# Patient Record
Sex: MALE | Race: WHITE
[De-identification: names, ages, dates, MRNs, and addresses within clinical notes are randomized per-mention and may not be internally consistent; named-entity substitution may affect disease eponyms.]

---

## 2018-01-17 ENCOUNTER — HOSPITAL ENCOUNTER (EMERGENCY)
Dept: HOSPITAL 17 - NEPD | Age: 63
Discharge: HOME | End: 2018-01-17
Payer: COMMERCIAL

## 2018-01-17 VITALS
RESPIRATION RATE: 15 BRPM | DIASTOLIC BLOOD PRESSURE: 79 MMHG | HEART RATE: 72 BPM | SYSTOLIC BLOOD PRESSURE: 147 MMHG | OXYGEN SATURATION: 98 %

## 2018-01-17 VITALS — OXYGEN SATURATION: 97 %

## 2018-01-17 VITALS
RESPIRATION RATE: 16 BRPM | HEART RATE: 68 BPM | TEMPERATURE: 99.2 F | SYSTOLIC BLOOD PRESSURE: 149 MMHG | OXYGEN SATURATION: 97 % | DIASTOLIC BLOOD PRESSURE: 76 MMHG

## 2018-01-17 VITALS — HEIGHT: 70 IN | WEIGHT: 174.17 LBS | BODY MASS INDEX: 24.93 KG/M2

## 2018-01-17 VITALS — OXYGEN SATURATION: 95 %

## 2018-01-17 DIAGNOSIS — J20.9: Primary | ICD-10-CM

## 2018-01-17 DIAGNOSIS — R94.31: ICD-10-CM

## 2018-01-17 LAB
ALBUMIN SERPL-MCNC: 3.5 GM/DL (ref 3.4–5)
ALP SERPL-CCNC: 99 U/L (ref 45–117)
ALT SERPL-CCNC: 25 U/L (ref 12–78)
AST SERPL-CCNC: 23 U/L (ref 15–37)
BASOPHILS # BLD AUTO: 0 TH/MM3 (ref 0–0.2)
BASOPHILS NFR BLD: 0.4 % (ref 0–2)
BILIRUB SERPL-MCNC: 0.7 MG/DL (ref 0.2–1)
BUN SERPL-MCNC: 12 MG/DL (ref 7–18)
CALCIUM SERPL-MCNC: 9.1 MG/DL (ref 8.5–10.1)
CHLORIDE SERPL-SCNC: 103 MEQ/L (ref 98–107)
CREAT SERPL-MCNC: 0.99 MG/DL (ref 0.6–1.3)
EOSINOPHIL # BLD: 0.1 TH/MM3 (ref 0–0.4)
EOSINOPHIL NFR BLD: 1.2 % (ref 0–4)
ERYTHROCYTE [DISTWIDTH] IN BLOOD BY AUTOMATED COUNT: 12.9 % (ref 11.6–17.2)
GFR SERPLBLD BASED ON 1.73 SQ M-ARVRAT: 77 ML/MIN (ref 89–?)
GLUCOSE SERPL-MCNC: 108 MG/DL (ref 74–106)
HCO3 BLD-SCNC: 26.1 MEQ/L (ref 21–32)
HCT VFR BLD CALC: 43.7 % (ref 39–51)
HGB BLD-MCNC: 14.8 GM/DL (ref 13–17)
LYMPHOCYTES # BLD AUTO: 1.1 TH/MM3 (ref 1–4.8)
LYMPHOCYTES NFR BLD AUTO: 10.8 % (ref 9–44)
MCH RBC QN AUTO: 33.6 PG (ref 27–34)
MCHC RBC AUTO-ENTMCNC: 33.9 % (ref 32–36)
MCV RBC AUTO: 99.2 FL (ref 80–100)
MONOCYTE #: 1.1 TH/MM3 (ref 0–0.9)
MONOCYTES NFR BLD: 10.9 % (ref 0–8)
NEUTROPHILS # BLD AUTO: 7.8 TH/MM3 (ref 1.8–7.7)
NEUTROPHILS NFR BLD AUTO: 76.7 % (ref 16–70)
PLATELET # BLD: 194 TH/MM3 (ref 150–450)
PMV BLD AUTO: 9.9 FL (ref 7–11)
PROT SERPL-MCNC: 7.7 GM/DL (ref 6.4–8.2)
RBC # BLD AUTO: 4.4 MIL/MM3 (ref 4.5–5.9)
SODIUM SERPL-SCNC: 136 MEQ/L (ref 136–145)
WBC # BLD AUTO: 10.2 TH/MM3 (ref 4–11)

## 2018-01-17 PROCEDURE — 94640 AIRWAY INHALATION TREATMENT: CPT

## 2018-01-17 PROCEDURE — 96375 TX/PRO/DX INJ NEW DRUG ADDON: CPT

## 2018-01-17 PROCEDURE — 80053 COMPREHEN METABOLIC PANEL: CPT

## 2018-01-17 PROCEDURE — 96366 THER/PROPH/DIAG IV INF ADDON: CPT

## 2018-01-17 PROCEDURE — 94664 DEMO&/EVAL PT USE INHALER: CPT

## 2018-01-17 PROCEDURE — 85025 COMPLETE CBC W/AUTO DIFF WBC: CPT

## 2018-01-17 PROCEDURE — 93005 ELECTROCARDIOGRAM TRACING: CPT

## 2018-01-17 PROCEDURE — 71046 X-RAY EXAM CHEST 2 VIEWS: CPT

## 2018-01-17 PROCEDURE — 96365 THER/PROPH/DIAG IV INF INIT: CPT

## 2018-01-17 PROCEDURE — 99285 EMERGENCY DEPT VISIT HI MDM: CPT

## 2018-01-17 RX ADMIN — IPRATROPIUM BROMIDE AND ALBUTEROL SULFATE SCH AMPULE: .5; 3 SOLUTION RESPIRATORY (INHALATION) at 10:05

## 2018-01-17 RX ADMIN — IPRATROPIUM BROMIDE AND ALBUTEROL SULFATE SCH AMPULE: .5; 3 SOLUTION RESPIRATORY (INHALATION) at 10:04

## 2018-01-17 NOTE — EKG
Date Performed: 01/17/2018       Time Performed: 08:24:24

 

PTAGE:      62 years

 

EKG:      Sinus rhythm 

 

 NONSPECIFIC T-WAVE ABNORMALITY BORDERLINE ECG 

 

NO PREVIOUS TRACING            

 

DOCTOR:   Erik Crespo  Interpretating Date/Time  01/17/2018 11:20:46

## 2018-01-17 NOTE — PD
HPI


Chief Complaint:  Cold / Flu Symptoms


Time Seen by Provider:  09:08


Travel History


International Travel<30 days:  No


Contact w/Intl Traveler<30days:  No


Traveled to known affect area:  No





History of Present Illness


HPI


62-year-old  male presents with 10 day history of like symptoms 

including upper respiratory congestion, previous sore throat, and now chest 

congestion, wheezing, shortness breath, and productive cough.  She has 

pleuritic pain with deep breath and cough.  He states no specific fever but has 

had chills off and on.  She is normally very healthy gillian who bicycles 

regularly.  He is a nonsmoker.  He has history of lung disease or wheezing in 

the past.  Pleuritic pain is 8 out of 10 with deep breath.  He has no known 

drug allergies.





PFSH


Past Medical History


Medical History:  Denies Significant Hx


Tetanus Vaccination:  > 5 Years


Influenza Vaccination:  No





Past Surgical History


Surgical History:  No Previous Surgery





Social History


Alcohol Use:  Yes (SOCIALLY )


Tobacco Use:  No


Substance Use:  No





Allergies-Medications


(Allergen,Severity, Reaction):  


Coded Allergies:  


     No Known Allergies (Unverified , 1/17/18)


Reported Meds & Prescriptions





Reported Meds & Active Scripts


Active


No Active Prescriptions or Reported Medications    








Review of Systems


Except as stated in HPI:  all other systems reviewed are Neg


General / Constitutional:  Positive: Fever, Chills (subjective)


Eyes:  No: Visual changes


HENT:  Positive: Sore Throat, Rhinitis, Rhinorrhea, Congestion, No: Headaches, 

Vertigo, Lightheadedness, Nosebleed, Neck Stiffness, Neck Pain, Dental 

Difficulties, Earache


Cardiovascular:  No: Chest Pain or Discomfort


Respiratory:  Positive: Cough, Shortness of Breath, Wheezing, Pleuritic Pain, No

: Sneezing, Orthopnea, Hemoptysis, Night Sweats


Gastrointestinal:  No: Nausea, Vomiting, Diarrhea, Abdominal Pain


Genitourinary:  No: Dysuria


Musculoskeletal:  No: Pain


Skin:  No Rash


Neurologic:  No: Weakness


Psychiatric:  No: Depression


Endocrine:  No: Polydipsia


Hematologic/Lymphatic:  No: Easy Bruising





Physical Exam


Narrative


GENERAL: Patient appears mildly ill but not septic.


SKIN: Warm and dry.  Normal color.  Normal turgor.  No rash.


HEAD: Atraumatic. Normocephalic. 


EYES: Pupils equal and round. No scleral icterus. No injection or drainage. 


ENT: No nasal bleeding or discharge.  Mucous membranes pink and moist.


NECK: Trachea midline.  Supple and nontender.


CARDIOVASCULAR: Regular rate and rhythm.  No murmurs gallops or rubs.  


RESPIRATORY: No accessory muscle use.  Diffuse wheezes and rales throughout to 

auscultation. Breath sounds equal bilaterally. 


MUSCULOSKELETAL: Extremities without clubbing, cyanosis, or edema. No obvious 

deformities. 


NEUROLOGICAL: Awake and alert. No obvious cranial nerve deficits.  Motor 

grossly within normal limits. Five out of 5 muscle strength in the arms and 

legs.  Normal speech.


PSYCHIATRIC: Appropriate mood and affect; insight and judgment normal.





Data


Data


Last Documented VS





Vital Signs








  Date Time  Temp Pulse Resp B/P (MAP) Pulse Ox O2 Delivery O2 Flow Rate FiO2


 


1/17/18 10:09     95   21


 


1/17/18 09:41      Room Air  


 


1/17/18 08:07 99.2 68 16     








Orders





 Orders


Electrocardiogram (1/17/18 )


Complete Blood Count With Diff (1/17/18 09:12)


Comprehensive Metabolic Panel (1/17/18 09:12)


Chest, Pa & Lat (1/17/18 09:12)


Ecg Monitoring (1/17/18 09:12)


Iv Access Insert/Monitor (1/17/18 09:12)


Oximetry (1/17/18 09:12)


Sodium Chloride 0.9% Flush (Ns Flush) (1/17/18 09:15)


Ceftriaxone Inj (Rocephin Inj) (1/17/18 09:15)


Azithromycin (Zithromax) (1/17/18 09:15)


Acetaminophen (Tylenol) (1/17/18 09:15)


Albuterol-Ipratropium Neb (Duoneb Neb) (1/17/18 09:15)


Methylprednisolone So Succ Inj (Solumedr (1/17/18 09:15)





Labs





Laboratory Tests








Test


  1/17/18


09:35











[facility]


Medical Decision Making


Medical Screen Exam Complete:  Yes


Emergency Medical Condition:  Yes


Differential Diagnosis


Wheezy bronchitis.  Pneumonia.  Influenza.


Narrative Course


Patient is medically stable at time of exam.


Chest x-ray PA and lateral was ordered.


The radiologist reads this chest x-ray as negative, however I feel there is 

prominence of his lung structures consistent with bronchiolitis.


CBC and CMP are ordered.


DuoNeb 3 is ordered.


Patient is given 1000 mg Rocephin IV.


Patient is given 125 mg Solu-Medrol IV.


Patient will be continued on azithromycin 500 mg daily for the next 5 days.


Patient continued on prednisone 20 mg twice a day 5 days.


Patient given albuterol metered-dose inhaler 2 puffs every 4-6 hours when 

necessary.


Patient is to follow with his primary care physician if symptoms do not improve.





Diagnosis





 Primary Impression:  


 Acute wheezy bronchitis


Referrals:  


Primary Care Physician


Patient Instructions:  Acute Bronchitis (ED), General Instructions, How to Use 

a Metered-Dose Inhaler (GEN), Wheezing (ED)





***Additional Instructions:  


The radiologist reads this chest x-ray as negative, however I feel there is 

prominence of his lung structures consistent with bronchiolitis.


Patient is given 1000 mg Rocephin IV.


Patient is given 125 mg Solu-Medrol IV.


Patient will be continued on azithromycin 500 mg daily for the next 5 days.


Patient continued on prednisone 20 mg twice a day 5 days.


Patient given albuterol metered-dose inhaler 2 puffs every 4-6 hours when 

necessary.


Patient is to follow with his primary care physician if symptoms do not improve.


***Med/Other Pt SpecificInfo:  Prescription(s) given


Scripts


No Active Prescriptions or Reported Meds


Disposition:  01 DISCHARGE HOME


Condition:  Stable











Jose Gracia Jan 17, 2018 09:08

## 2018-01-17 NOTE — RADRPT
EXAM DATE/TIME:  01/17/2018 09:24 

 

[facility] COMPARISON:     

No previous studies available for comparison.

 

                     

INDICATIONS :     

Cough x10 days, chest pains x2 days radiating into left chest wall.

                     

 

MEDICAL HISTORY :     

None.          

 

SURGICAL HISTORY :     

None.   

 

ENCOUNTER:     

Initial                                        

 

ACUITY:     

2 weeks      

 

PAIN SCORE:     

5/10

 

LOCATION:     

Left chest 

 

FINDINGS:     

PA and lateral views of the chest demonstrate the lungs to be symmetrically aerated without evidence 
of mass, infiltrate or effusion.  The cardiomediastinal contours are unremarkable.  Osseous structure
s are intact.

 

CONCLUSION:     

Normal examination.  Small calcified granuloma right upper lobe

 

 

 

 Erik Mueller MD on January 17, 2018 at 9:32           

Board Certified Radiologist.

 This report was verified electronically.